# Patient Record
Sex: FEMALE | Race: BLACK OR AFRICAN AMERICAN | NOT HISPANIC OR LATINO | Employment: FULL TIME | URBAN - METROPOLITAN AREA
[De-identification: names, ages, dates, MRNs, and addresses within clinical notes are randomized per-mention and may not be internally consistent; named-entity substitution may affect disease eponyms.]

---

## 2023-02-06 PROBLEM — M17.9 OSTEOARTHRITIS OF KNEE: Status: ACTIVE | Noted: 2023-02-06

## 2023-02-06 PROBLEM — M20.11 HALLUX VALGUS WITH BUNIONS OF RIGHT FOOT: Status: ACTIVE | Noted: 2023-02-06

## 2023-02-06 PROBLEM — R30.0 DYSURIA: Status: ACTIVE | Noted: 2023-02-06

## 2023-02-06 PROBLEM — R31.21 ASYMPTOMATIC MICROSCOPIC HEMATURIA: Status: ACTIVE | Noted: 2023-02-06

## 2023-02-06 PROBLEM — R21 LOCALIZED RASH: Status: ACTIVE | Noted: 2023-02-06

## 2023-02-06 PROBLEM — L74.9 SWEATING ABNORMALITY: Status: ACTIVE | Noted: 2023-02-06

## 2023-02-06 PROBLEM — M21.611 HALLUX VALGUS WITH BUNIONS OF RIGHT FOOT: Status: ACTIVE | Noted: 2023-02-06

## 2023-02-06 PROBLEM — R00.2 PALPITATIONS: Status: ACTIVE | Noted: 2023-02-06

## 2023-02-06 PROBLEM — L84 CORN OF FOOT: Status: ACTIVE | Noted: 2023-02-06

## 2023-02-06 PROBLEM — J30.2 SEASONAL ALLERGIES: Status: ACTIVE | Noted: 2023-02-06

## 2023-02-06 PROBLEM — R23.2 HOT FLASHES: Status: ACTIVE | Noted: 2023-02-06

## 2023-02-06 PROBLEM — E66.9 OBESITY WITH BODY MASS INDEX 30 OR GREATER: Status: ACTIVE | Noted: 2023-02-06

## 2023-02-06 PROBLEM — E66.9 OBESITY: Status: ACTIVE | Noted: 2023-02-06

## 2023-02-06 PROBLEM — E55.9 VITAMIN D DEFICIENCY: Status: ACTIVE | Noted: 2023-02-06

## 2023-02-06 PROBLEM — F32.A DEPRESSION: Status: ACTIVE | Noted: 2023-02-06

## 2023-02-06 PROBLEM — G47.00 INSOMNIA: Status: ACTIVE | Noted: 2023-02-06

## 2023-02-06 PROBLEM — I10 ESSENTIAL HYPERTENSION: Status: ACTIVE | Noted: 2023-02-06

## 2023-02-06 PROBLEM — N64.4 BREAST PAIN: Status: ACTIVE | Noted: 2023-02-06

## 2023-02-06 PROBLEM — I49.3 PVC (PREMATURE VENTRICULAR CONTRACTION): Status: ACTIVE | Noted: 2023-02-06

## 2023-02-06 PROBLEM — M75.21 BICEPS TENDINITIS, RIGHT: Status: ACTIVE | Noted: 2023-02-06

## 2023-02-06 RX ORDER — ACETAMINOPHEN 500 MG
100 TABLET ORAL DAILY
COMMUNITY
Start: 2020-03-11

## 2023-02-06 RX ORDER — ZINC GLUCONATE 50 MG
1 TABLET ORAL DAILY
COMMUNITY
Start: 2022-04-06

## 2023-02-06 RX ORDER — UBIDECARENONE 30 MG
1 CAPSULE ORAL DAILY
COMMUNITY
Start: 2022-04-06

## 2023-02-06 RX ORDER — PAROXETINE 10 MG/1
1 TABLET, FILM COATED ORAL DAILY
COMMUNITY
Start: 2020-07-07 | End: 2023-03-20 | Stop reason: SDUPTHER

## 2023-02-06 RX ORDER — CYANOCOBALAMIN (VITAMIN B-12) 1000MCG/15
LIQUID (ML) ORAL
COMMUNITY
Start: 2022-04-06

## 2023-02-06 RX ORDER — LORATADINE 10 MG/1
1 TABLET ORAL DAILY
COMMUNITY
Start: 2017-07-13 | End: 2023-06-14 | Stop reason: SDUPTHER

## 2023-03-20 ENCOUNTER — OFFICE VISIT (OUTPATIENT)
Dept: PRIMARY CARE | Facility: CLINIC | Age: 53
End: 2023-03-20
Payer: MEDICAID

## 2023-03-20 VITALS
SYSTOLIC BLOOD PRESSURE: 124 MMHG | BODY MASS INDEX: 29.29 KG/M2 | WEIGHT: 209.2 LBS | HEIGHT: 71 IN | DIASTOLIC BLOOD PRESSURE: 82 MMHG | TEMPERATURE: 97.9 F

## 2023-03-20 DIAGNOSIS — Z00.00 ROUTINE GENERAL MEDICAL EXAMINATION AT A HEALTH CARE FACILITY: ICD-10-CM

## 2023-03-20 DIAGNOSIS — F32.A DEPRESSION, UNSPECIFIED DEPRESSION TYPE: Primary | ICD-10-CM

## 2023-03-20 DIAGNOSIS — G47.00 INSOMNIA, UNSPECIFIED TYPE: ICD-10-CM

## 2023-03-20 PROCEDURE — 3079F DIAST BP 80-89 MM HG: CPT | Performed by: FAMILY MEDICINE

## 2023-03-20 PROCEDURE — 3074F SYST BP LT 130 MM HG: CPT | Performed by: FAMILY MEDICINE

## 2023-03-20 PROCEDURE — 99214 OFFICE O/P EST MOD 30 MIN: CPT | Performed by: FAMILY MEDICINE

## 2023-03-20 RX ORDER — PAROXETINE HYDROCHLORIDE 20 MG/1
20 TABLET, FILM COATED ORAL EVERY MORNING
Qty: 180 TABLET | Refills: 1 | Status: SHIPPED | OUTPATIENT
Start: 2023-03-20 | End: 2024-04-12 | Stop reason: SDUPTHER

## 2023-03-20 RX ORDER — TRAZODONE HYDROCHLORIDE 50 MG/1
50 TABLET ORAL NIGHTLY PRN
Qty: 30 TABLET | Refills: 0 | Status: SHIPPED | OUTPATIENT
Start: 2023-03-20 | End: 2023-05-11 | Stop reason: SDUPTHER

## 2023-03-20 RX ORDER — PAROXETINE 10 MG/1
20 TABLET, FILM COATED ORAL EVERY MORNING
Qty: 180 TABLET | Refills: 1 | Status: SHIPPED | OUTPATIENT
Start: 2023-03-20 | End: 2023-03-20 | Stop reason: SDUPTHER

## 2023-03-20 RX ORDER — PROPYLENE GLYCOL 0.06 MG/ML
SOLUTION/ DROPS OPHTHALMIC
COMMUNITY
Start: 2022-09-27 | End: 2024-02-29 | Stop reason: WASHOUT

## 2023-03-20 RX ORDER — ZOLPIDEM TARTRATE 5 MG/1
5 TABLET ORAL NIGHTLY PRN
COMMUNITY
Start: 2022-09-12 | End: 2024-02-29 | Stop reason: WASHOUT

## 2023-03-20 ASSESSMENT — ENCOUNTER SYMPTOMS
SINUS PRESSURE: 0
WOUND: 0
JOINT SWELLING: 0
DIZZINESS: 0
NERVOUS/ANXIOUS: 1
VOMITING: 0
FATIGUE: 0
ACTIVITY CHANGE: 0
LIGHT-HEADEDNESS: 0
PALPITATIONS: 0
ARTHRALGIAS: 0
HEADACHES: 0
NAUSEA: 0
APPETITE CHANGE: 0
DYSURIA: 0
SLEEP DISTURBANCE: 1
EYE PAIN: 0
BLOOD IN STOOL: 0
DYSPHORIC MOOD: 1
CONSTIPATION: 0
SHORTNESS OF BREATH: 0
COUGH: 0
DIARRHEA: 0
ABDOMINAL PAIN: 0

## 2023-03-20 ASSESSMENT — PROMIS GLOBAL HEALTH SCALE
EMOTIONAL_PROBLEMS: OFTEN
RATE_MENTAL_HEALTH: FAIR
RATE_PHYSICAL_HEALTH: GOOD
RATE_AVERAGE_FATIGUE: MODERATE
CARRYOUT_PHYSICAL_ACTIVITIES: MODERATELY
RATE_SOCIAL_SATISFACTION: FAIR
CARRYOUT_SOCIAL_ACTIVITIES: GOOD
RATE_QUALITY_OF_LIFE: GOOD
RATE_AVERAGE_PAIN: 5
RATE_GENERAL_HEALTH: VERY GOOD

## 2023-03-20 ASSESSMENT — PATIENT HEALTH QUESTIONNAIRE - PHQ9
2. FEELING DOWN, DEPRESSED OR HOPELESS: NEARLY EVERY DAY
1. LITTLE INTEREST OR PLEASURE IN DOING THINGS: SEVERAL DAYS
SUM OF ALL RESPONSES TO PHQ9 QUESTIONS 1 AND 2: 4

## 2023-03-20 NOTE — PATIENT INSTRUCTIONS
For scheduling general referrals or orders please call [address] [place] [address] ([phone]).  For scheduling radiology appointments please call [phone].

## 2023-03-20 NOTE — PROGRESS NOTES
"Subjective   Patient ID: Essie Olvera is a 52 y.o. female who presents for Follow-up (Discuss depression ).    HPI   Here today to discuss concerns  She has had a lot of situational stress in her family life in the last few months and feels like it is exacerbating her depression  She has previously been treated for depression but is now only currently Taking paxil 10 mg for hot flashes  Wants to do some counseling as well  She is having a really hard time sleeping due to mood . Melatonin doesn't help   No SI or HI  She has been talking to people in her [facility] which is helpful    She is doing very well after knee revision surgery  She is wondering about if she needs a mammogram  Her colonoscopy is up-to-date  She is status post hysterectomy  Review of Systems   Constitutional:  Negative for activity change, appetite change and fatigue.   HENT:  Negative for congestion, postnasal drip and sinus pressure.    Eyes:  Negative for pain and visual disturbance.   Respiratory:  Negative for cough and shortness of breath.    Cardiovascular:  Negative for chest pain, palpitations and leg swelling.   Gastrointestinal:  Negative for abdominal pain, blood in stool, constipation, diarrhea, nausea and vomiting.   Endocrine: Negative for cold intolerance and heat intolerance.   Genitourinary:  Negative for dysuria and menstrual problem.   Musculoskeletal:  Negative for arthralgias and joint swelling.   Skin:  Negative for rash and wound.   Neurological:  Negative for dizziness, light-headedness and headaches.   Psychiatric/Behavioral:  Positive for dysphoric mood and sleep disturbance. The patient is nervous/anxious.        Objective   /82   Temp 36.6 °C (97.9 °F)   Ht 1.81 m (5' 11.25\")   Wt 94.9 kg (209 lb 3.2 oz)   BMI 28.97 kg/m²     Physical Exam  Vitals and nursing note reviewed.   Constitutional:       Appearance: Normal appearance.   HENT:      Head: Normocephalic and atraumatic.      Right Ear: Tympanic " membrane, ear canal and external ear normal.      Left Ear: Tympanic membrane, ear canal and external ear normal.      Nose: Nose normal.      Mouth/Throat:      Mouth: Mucous membranes are moist.      Pharynx: Oropharynx is clear.   Eyes:      Extraocular Movements: Extraocular movements intact.      Conjunctiva/sclera: Conjunctivae normal.      Pupils: Pupils are equal, round, and reactive to light.   Cardiovascular:      Rate and Rhythm: Normal rate and regular rhythm.      Pulses: Normal pulses.      Heart sounds: Normal heart sounds. No murmur heard.     No friction rub. No gallop.   Pulmonary:      Effort: Pulmonary effort is normal. No respiratory distress.      Breath sounds: Normal breath sounds.   Musculoskeletal:         General: No swelling or deformity. Normal range of motion.      Cervical back: Normal range of motion and neck supple.   Lymphadenopathy:      Cervical: No cervical adenopathy.   Skin:     General: Skin is warm and dry.      Capillary Refill: Capillary refill takes less than 2 seconds.      Findings: No rash.   Neurological:      General: No focal deficit present.      Mental Status: She is alert and oriented to person, place, and time. Mental status is at baseline.      Cranial Nerves: No cranial nerve deficit.      Gait: Gait normal.      Deep Tendon Reflexes: Reflexes normal.   Psychiatric:         Thought Content: Thought content normal.         Judgment: Judgment normal.      Comments: tearful         Assessment/Plan   Problem List Items Addressed This Visit          Nervous    Insomnia    Relevant Medications    traZODone (Desyrel) 50 mg tablet       Other    Depression - Primary    Relevant Medications    PARoxetine (Paxil) 10 mg tablet    traZODone (Desyrel) 50 mg tablet    Other Relevant Orders    Referral to Psychology     Other Visit Diagnoses       Routine general medical examination at a health care facility        Relevant Orders    BI mammo bilateral screening tomosynthesis         We discussed increasing her Paxil to 20 mg daily.  We will also add trazodone.  Risk benefits and side effects of medication discussed  Counseling referral given  We will plan to see her back in about 3 months unless concerns sooner  Mammogram ordered    Patient verbalized understanding of plan of care and all questions were answered.       Baltazar Yeh, DO

## 2023-04-03 ENCOUNTER — TELEPHONE (OUTPATIENT)
Dept: PRIMARY CARE | Facility: CLINIC | Age: 53
End: 2023-04-03

## 2023-04-03 NOTE — TELEPHONE ENCOUNTER
----- Message from Baltazar Yeh DO sent at 4/3/2023  7:57 AM EDT -----  Mammogram is normal. Recheck yearly unless concern sooner.

## 2023-05-11 DIAGNOSIS — F32.A DEPRESSION, UNSPECIFIED DEPRESSION TYPE: ICD-10-CM

## 2023-05-11 DIAGNOSIS — G47.00 INSOMNIA, UNSPECIFIED TYPE: ICD-10-CM

## 2023-05-12 RX ORDER — TRAZODONE HYDROCHLORIDE 50 MG/1
50 TABLET ORAL NIGHTLY PRN
Qty: 30 TABLET | Refills: 0 | Status: SHIPPED | OUTPATIENT
Start: 2023-05-12 | End: 2023-08-30 | Stop reason: SDUPTHER

## 2023-06-14 DIAGNOSIS — J30.2 SEASONAL ALLERGIES: Primary | ICD-10-CM

## 2023-06-14 RX ORDER — LORATADINE 10 MG/1
10 TABLET ORAL DAILY
Qty: 90 TABLET | Refills: 3 | Status: SHIPPED | OUTPATIENT
Start: 2023-06-14 | End: 2024-04-12 | Stop reason: SDUPTHER

## 2023-06-14 RX ORDER — LORATADINE 10 MG/1
10 TABLET ORAL
Status: CANCELLED | OUTPATIENT
Start: 2023-06-14

## 2023-06-26 ENCOUNTER — APPOINTMENT (OUTPATIENT)
Dept: PRIMARY CARE | Facility: CLINIC | Age: 53
End: 2023-06-26

## 2023-08-02 ENCOUNTER — OFFICE VISIT (OUTPATIENT)
Dept: PRIMARY CARE | Facility: CLINIC | Age: 53
End: 2023-08-02
Payer: MEDICAID

## 2023-08-02 VITALS
DIASTOLIC BLOOD PRESSURE: 90 MMHG | TEMPERATURE: 97.3 F | SYSTOLIC BLOOD PRESSURE: 130 MMHG | WEIGHT: 205.6 LBS | BODY MASS INDEX: 28.47 KG/M2

## 2023-08-02 DIAGNOSIS — R35.0 URINARY FREQUENCY: Primary | ICD-10-CM

## 2023-08-02 LAB
APPEARANCE, URINE: NORMAL
BILIRUBIN, URINE: NEGATIVE
BLOOD, URINE: NEGATIVE
COLOR, URINE: YELLOW
GLUCOSE, URINE: NEGATIVE MG/DL
KETONES, URINE: NEGATIVE MG/DL
LEUKOCYTE ESTERASE, URINE: NEGATIVE
NITRITE, URINE: NEGATIVE
PH, URINE: 5 (ref 5–8)
PROTEIN, URINE: NEGATIVE MG/DL
SPECIFIC GRAVITY, URINE: 1.03 (ref 1–1.03)
UROBILINOGEN, URINE: <2 MG/DL (ref 0–1.9)

## 2023-08-02 PROCEDURE — 87661 TRICHOMONAS VAGINALIS AMPLIF: CPT

## 2023-08-02 PROCEDURE — 87086 URINE CULTURE/COLONY COUNT: CPT

## 2023-08-02 PROCEDURE — 3075F SYST BP GE 130 - 139MM HG: CPT | Performed by: FAMILY MEDICINE

## 2023-08-02 PROCEDURE — 87491 CHLMYD TRACH DNA AMP PROBE: CPT

## 2023-08-02 PROCEDURE — 3080F DIAST BP >= 90 MM HG: CPT | Performed by: FAMILY MEDICINE

## 2023-08-02 PROCEDURE — 99213 OFFICE O/P EST LOW 20 MIN: CPT | Performed by: FAMILY MEDICINE

## 2023-08-02 PROCEDURE — 87591 N.GONORRHOEAE DNA AMP PROB: CPT

## 2023-08-02 PROCEDURE — 81003 URINALYSIS AUTO W/O SCOPE: CPT

## 2023-08-02 RX ORDER — PRENATAL VIT CALC,IRON,FOLIC
500 TABLET ORAL
COMMUNITY
Start: 2021-05-28

## 2023-08-02 ASSESSMENT — ENCOUNTER SYMPTOMS
FREQUENCY: 1
HEMATURIA: 0
CHILLS: 0
FLANK PAIN: 0
NAUSEA: 0
SWEATS: 0
VOMITING: 0

## 2023-08-02 ASSESSMENT — PATIENT HEALTH QUESTIONNAIRE - PHQ9
1. LITTLE INTEREST OR PLEASURE IN DOING THINGS: NOT AT ALL
2. FEELING DOWN, DEPRESSED OR HOPELESS: NOT AT ALL
SUM OF ALL RESPONSES TO PHQ9 QUESTIONS 1 AND 2: 0

## 2023-08-02 NOTE — PROGRESS NOTES
Subjective   Patient ID: Essie Olvera is a 53 y.o. female who presents for UTI.    UTI   This is a new problem. The problem has been gradually improving. Associated symptoms include frequency, hesitancy, a possible pregnancy and urgency. Pertinent negatives include no chills, discharge, flank pain, hematuria, nausea, sweats or vomiting.    No recent sexually activity   No std concerns     Review of Systems   Constitutional:  Negative for chills.   Gastrointestinal:  Negative for nausea and vomiting.   Genitourinary:  Positive for frequency, hesitancy and urgency. Negative for flank pain and hematuria.       Objective   /90   Temp 36.3 °C (97.3 °F)   Wt 93.3 kg (205 lb 9.6 oz)   BMI 28.47 kg/m²     Physical Exam  Vitals and nursing note reviewed.   Constitutional:       Appearance: Normal appearance. She is normal weight.   HENT:      Head: Normocephalic and atraumatic.      Right Ear: Tympanic membrane, ear canal and external ear normal.      Left Ear: Tympanic membrane, ear canal and external ear normal.      Mouth/Throat:      Mouth: Mucous membranes are moist.   Eyes:      Conjunctiva/sclera: Conjunctivae normal.   Cardiovascular:      Rate and Rhythm: Normal rate and regular rhythm.      Heart sounds: Normal heart sounds.   Pulmonary:      Effort: Pulmonary effort is normal.      Breath sounds: Normal breath sounds.   Abdominal:      General: Abdomen is flat.      Palpations: Abdomen is soft.   Musculoskeletal:         General: No swelling.      Cervical back: Normal range of motion and neck supple.   Skin:     General: Skin is warm and dry.      Capillary Refill: Capillary refill takes less than 2 seconds.   Neurological:      General: No focal deficit present.      Mental Status: She is alert. Mental status is at baseline.   Psychiatric:         Mood and Affect: Mood normal.         Behavior: Behavior normal.         Thought Content: Thought content normal.         Judgment: Judgment normal.          Assessment/Plan   1. Urinary frequency  Labs as below. Follow up based on results   - C. trachomatis / N. gonorrhoeae, DNA probe; Future  - Trichomonas vaginalis, Amplified; Future  - Urine Culture; Future  - Urinalysis with Reflex Microscopic; Future      Baltazar Yeh, DO

## 2023-08-03 ENCOUNTER — TELEPHONE (OUTPATIENT)
Dept: PRIMARY CARE | Facility: CLINIC | Age: 53
End: 2023-08-03
Payer: MEDICAID

## 2023-08-03 NOTE — TELEPHONE ENCOUNTER
----- Message from Baltazar Yeh DO sent at 8/3/2023  6:56 AM EDT -----  Let pt know urine was normal

## 2023-08-04 LAB
CHLAMYDIA TRACH., AMPLIFIED: NEGATIVE
N. GONORRHEA, AMPLIFIED: NEGATIVE
TRICHOMONAS VAGINALIS: NEGATIVE
URINE CULTURE: NORMAL

## 2023-08-30 DIAGNOSIS — F32.A DEPRESSION, UNSPECIFIED DEPRESSION TYPE: ICD-10-CM

## 2023-08-30 DIAGNOSIS — G47.00 INSOMNIA, UNSPECIFIED TYPE: ICD-10-CM

## 2023-08-30 RX ORDER — TRAZODONE HYDROCHLORIDE 50 MG/1
50 TABLET ORAL NIGHTLY PRN
Qty: 30 TABLET | Refills: 3 | Status: SHIPPED | OUTPATIENT
Start: 2023-08-30 | End: 2024-01-03 | Stop reason: SDUPTHER

## 2023-09-07 RX ORDER — METOPROLOL SUCCINATE 25 MG/1
1 TABLET, EXTENDED RELEASE ORAL DAILY
COMMUNITY
Start: 2020-09-08 | End: 2024-02-29 | Stop reason: WASHOUT

## 2023-09-11 ENCOUNTER — APPOINTMENT (OUTPATIENT)
Dept: PRIMARY CARE | Facility: CLINIC | Age: 53
End: 2023-09-11
Payer: MEDICAID

## 2023-10-04 ENCOUNTER — OFFICE VISIT (OUTPATIENT)
Dept: PRIMARY CARE | Facility: CLINIC | Age: 53
End: 2023-10-04
Payer: COMMERCIAL

## 2023-10-04 VITALS
HEART RATE: 73 BPM | DIASTOLIC BLOOD PRESSURE: 75 MMHG | SYSTOLIC BLOOD PRESSURE: 129 MMHG | BODY MASS INDEX: 28.97 KG/M2 | WEIGHT: 209.2 LBS | TEMPERATURE: 97.8 F

## 2023-10-04 DIAGNOSIS — Z23 ENCOUNTER FOR IMMUNIZATION: Primary | ICD-10-CM

## 2023-10-04 PROCEDURE — 3078F DIAST BP <80 MM HG: CPT | Performed by: INTERNAL MEDICINE

## 2023-10-04 PROCEDURE — 90686 IIV4 VACC NO PRSV 0.5 ML IM: CPT | Performed by: INTERNAL MEDICINE

## 2023-10-04 PROCEDURE — 99213 OFFICE O/P EST LOW 20 MIN: CPT | Performed by: INTERNAL MEDICINE

## 2023-10-04 PROCEDURE — G0008 ADMIN INFLUENZA VIRUS VAC: HCPCS | Performed by: INTERNAL MEDICINE

## 2023-10-04 PROCEDURE — 3074F SYST BP LT 130 MM HG: CPT | Performed by: INTERNAL MEDICINE

## 2023-10-04 RX ORDER — NAPROXEN 500 MG/1
500 TABLET ORAL
COMMUNITY
Start: 2023-09-28 | End: 2023-10-13
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
## 2023-11-28 ENCOUNTER — TELEPHONE (OUTPATIENT)
Dept: PRIMARY CARE | Facility: CLINIC | Age: 53
End: 2023-11-28
Payer: MEDICAID

## 2023-11-28 NOTE — TELEPHONE ENCOUNTER
Patient had 3rd no show 11/27/23.  She also missed 7/7/22 and 9/27/22.  Patient was mailed the appointment policy after 9/27/22 missed appointment.  Please advise

## 2024-01-03 DIAGNOSIS — G47.00 INSOMNIA, UNSPECIFIED TYPE: ICD-10-CM

## 2024-01-03 DIAGNOSIS — F32.A DEPRESSION, UNSPECIFIED DEPRESSION TYPE: ICD-10-CM

## 2024-01-03 RX ORDER — TRAZODONE HYDROCHLORIDE 50 MG/1
50 TABLET ORAL NIGHTLY PRN
Qty: 30 TABLET | Refills: 3 | Status: SHIPPED | OUTPATIENT
Start: 2024-01-03 | End: 2025-01-02

## 2024-02-28 PROBLEM — M25.661 DECREASED RANGE OF MOTION OF RIGHT KNEE: Status: RESOLVED | Noted: 2022-05-25 | Resolved: 2024-02-28

## 2024-02-28 PROBLEM — H52.7 REFRACTIVE ERROR: Status: ACTIVE | Noted: 2017-07-26

## 2024-02-28 PROBLEM — N39.0 URINARY TRACT INFECTIOUS DISEASE: Status: RESOLVED | Noted: 2024-02-28 | Resolved: 2024-02-28

## 2024-02-28 PROBLEM — R80.9 URINE TEST POSITIVE FOR MICROALBUMINURIA: Status: ACTIVE | Noted: 2017-05-19

## 2024-02-28 PROBLEM — M54.9 BACK PAIN: Status: RESOLVED | Noted: 2021-02-25 | Resolved: 2024-02-28

## 2024-02-28 PROBLEM — M25.562 CHRONIC PAIN OF BOTH KNEES: Status: RESOLVED | Noted: 2019-06-28 | Resolved: 2024-02-28

## 2024-02-28 PROBLEM — H53.8 BLURRY VISION, BILATERAL: Status: RESOLVED | Noted: 2017-07-26 | Resolved: 2024-02-28

## 2024-02-28 PROBLEM — H52.4 MYOPIA WITH PRESBYOPIA OF BOTH EYES: Status: ACTIVE | Noted: 2022-09-27

## 2024-02-28 PROBLEM — R29.898 WEAKNESS OF BOTH LOWER EXTREMITIES: Status: RESOLVED | Noted: 2018-10-23 | Resolved: 2024-02-28

## 2024-02-28 PROBLEM — Q14.0: Status: ACTIVE | Noted: 2017-07-26

## 2024-02-28 PROBLEM — H00.11 CHALAZION OF RIGHT UPPER EYELID: Status: RESOLVED | Noted: 2017-07-26 | Resolved: 2024-02-28

## 2024-02-28 PROBLEM — N76.0 BACTERIAL VAGINOSIS: Status: RESOLVED | Noted: 2024-02-28 | Resolved: 2024-02-28

## 2024-02-28 PROBLEM — B96.89 ACUTE BACTERIAL SINUSITIS: Status: RESOLVED | Noted: 2024-02-28 | Resolved: 2024-02-28

## 2024-02-28 PROBLEM — J01.90 ACUTE BACTERIAL SINUSITIS: Status: RESOLVED | Noted: 2024-02-28 | Resolved: 2024-02-28

## 2024-02-28 PROBLEM — R29.898 BILATERAL LEG WEAKNESS: Status: RESOLVED | Noted: 2018-10-23 | Resolved: 2024-02-28

## 2024-02-28 PROBLEM — H53.2 DIPLOPIA: Status: ACTIVE | Noted: 2022-09-27

## 2024-02-28 PROBLEM — M25.561 CHRONIC PAIN OF BOTH KNEES: Status: RESOLVED | Noted: 2019-06-28 | Resolved: 2024-02-28

## 2024-02-28 PROBLEM — B37.2 CANDIDAL INTERTRIGO: Status: RESOLVED | Noted: 2024-02-28 | Resolved: 2024-02-28

## 2024-02-28 PROBLEM — G89.29 CHRONIC PAIN OF BOTH KNEES: Status: RESOLVED | Noted: 2019-06-28 | Resolved: 2024-02-28

## 2024-02-28 PROBLEM — H04.123 DRY EYE SYNDROME OF BOTH EYES: Status: ACTIVE | Noted: 2017-07-26

## 2024-02-28 PROBLEM — H52.13 MYOPIA WITH PRESBYOPIA OF BOTH EYES: Status: ACTIVE | Noted: 2022-09-27

## 2024-02-28 PROBLEM — N64.81 BREAST PTOSIS: Status: ACTIVE | Noted: 2021-09-13

## 2024-02-28 PROBLEM — K44.9 HIATAL HERNIA: Status: ACTIVE | Noted: 2021-02-25

## 2024-02-28 PROBLEM — M77.11 LATERAL EPICONDYLITIS OF RIGHT ELBOW: Status: ACTIVE | Noted: 2018-09-07

## 2024-02-28 PROBLEM — G89.29 CHRONIC LOW BACK PAIN: Status: RESOLVED | Noted: 2021-03-30 | Resolved: 2024-02-28

## 2024-02-28 PROBLEM — R53.81 OTHER MALAISE: Status: RESOLVED | Noted: 2021-03-30 | Resolved: 2024-02-28

## 2024-02-28 PROBLEM — J20.9 ACUTE BRONCHITIS: Status: RESOLVED | Noted: 2024-02-28 | Resolved: 2024-02-28

## 2024-02-28 PROBLEM — L57.4 LAXITY OF SKIN: Status: ACTIVE | Noted: 2021-09-13

## 2024-02-28 PROBLEM — M54.50 CHRONIC LOW BACK PAIN: Status: RESOLVED | Noted: 2021-03-30 | Resolved: 2024-02-28

## 2024-02-28 PROBLEM — N89.8 VAGINAL DISCHARGE: Status: RESOLVED | Noted: 2024-02-28 | Resolved: 2024-02-28

## 2024-02-28 PROBLEM — B96.89 BACTERIAL VAGINOSIS: Status: RESOLVED | Noted: 2024-02-28 | Resolved: 2024-02-28

## 2024-02-28 PROBLEM — Z86.19 HISTORY OF CHLAMYDIA: Status: RESOLVED | Noted: 2017-05-20 | Resolved: 2024-02-28

## 2024-02-28 PROBLEM — J06.9 VIRAL UPPER RESPIRATORY TRACT INFECTION: Status: RESOLVED | Noted: 2024-02-28 | Resolved: 2024-02-28

## 2024-02-29 ENCOUNTER — OFFICE VISIT (OUTPATIENT)
Dept: PRIMARY CARE | Facility: CLINIC | Age: 54
End: 2024-02-29
Payer: COMMERCIAL

## 2024-02-29 VITALS
BODY MASS INDEX: 28 KG/M2 | HEIGHT: 71 IN | DIASTOLIC BLOOD PRESSURE: 80 MMHG | WEIGHT: 200 LBS | TEMPERATURE: 97.3 F | SYSTOLIC BLOOD PRESSURE: 138 MMHG

## 2024-02-29 DIAGNOSIS — M77.8 LEFT ELBOW TENDONITIS: Primary | ICD-10-CM

## 2024-02-29 PROCEDURE — 3075F SYST BP GE 130 - 139MM HG: CPT | Performed by: NURSE PRACTITIONER

## 2024-02-29 PROCEDURE — 3079F DIAST BP 80-89 MM HG: CPT | Performed by: NURSE PRACTITIONER

## 2024-02-29 PROCEDURE — 99213 OFFICE O/P EST LOW 20 MIN: CPT | Performed by: NURSE PRACTITIONER

## 2024-02-29 PROCEDURE — 1036F TOBACCO NON-USER: CPT | Performed by: NURSE PRACTITIONER

## 2024-02-29 ASSESSMENT — ENCOUNTER SYMPTOMS: CONSTITUTIONAL NEGATIVE: 1

## 2024-02-29 NOTE — PROGRESS NOTES
"Subjective   Patient ID: Essie Olvera is a 53 y.o. female who presents for Elbow Pain (Left sided x months).    HPI Presents today with concerns for left elbow pain.  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   Her left elbow started to bother her a bit and it has progressed eoer the past few month.  Hurts now to completley extended.  Has pinpoint pain.  No injury or fall noted.  No redness, selling or warmth noted    Review of Systems   Constitutional: Negative.    Musculoskeletal:         As noted in HPI         Objective   /80 (BP Location: Left arm, Patient Position: Sitting)   Temp 36.3 °C (97.3 °F) (Temporal)   Ht 1.81 m (5' 11.25\")   Wt 90.7 kg (200 lb)   BMI 27.70 kg/m²     Physical Exam  Constitutional:       Appearance: Normal appearance. She is normal weight.   Musculoskeletal:      Comments: Pain with with palpation on radial collateral ligament with palpation.  NO redness or swelling noted.    Neurological:      Mental Status: She is alert.         Assessment/Plan   Problem List Items Addressed This Visit    None  Visit Diagnoses         Codes    Left elbow tendonitis    -  Primary M77.8    Relevant Orders    Referral to Orthopaedic Surgery               "

## 2024-04-12 ENCOUNTER — OFFICE VISIT (OUTPATIENT)
Dept: PRIMARY CARE | Facility: CLINIC | Age: 54
End: 2024-04-12
Payer: COMMERCIAL

## 2024-04-12 VITALS
TEMPERATURE: 98.2 F | DIASTOLIC BLOOD PRESSURE: 80 MMHG | BODY MASS INDEX: 28.67 KG/M2 | SYSTOLIC BLOOD PRESSURE: 120 MMHG | WEIGHT: 207 LBS

## 2024-04-12 DIAGNOSIS — K59.00 CONSTIPATION, UNSPECIFIED CONSTIPATION TYPE: Primary | ICD-10-CM

## 2024-04-12 DIAGNOSIS — F32.A DEPRESSION, UNSPECIFIED DEPRESSION TYPE: ICD-10-CM

## 2024-04-12 DIAGNOSIS — J30.2 SEASONAL ALLERGIES: ICD-10-CM

## 2024-04-12 DIAGNOSIS — Z12.31 ENCOUNTER FOR SCREENING MAMMOGRAM FOR MALIGNANT NEOPLASM OF BREAST: ICD-10-CM

## 2024-04-12 PROCEDURE — 3074F SYST BP LT 130 MM HG: CPT | Performed by: FAMILY MEDICINE

## 2024-04-12 PROCEDURE — 99214 OFFICE O/P EST MOD 30 MIN: CPT | Performed by: FAMILY MEDICINE

## 2024-04-12 PROCEDURE — 3079F DIAST BP 80-89 MM HG: CPT | Performed by: FAMILY MEDICINE

## 2024-04-12 PROCEDURE — 1036F TOBACCO NON-USER: CPT | Performed by: FAMILY MEDICINE

## 2024-04-12 RX ORDER — PAROXETINE HYDROCHLORIDE 20 MG/1
20 TABLET, FILM COATED ORAL EVERY MORNING
Qty: 180 TABLET | Refills: 1 | Status: SHIPPED | OUTPATIENT
Start: 2024-04-12 | End: 2024-05-01 | Stop reason: ALTCHOICE

## 2024-04-12 RX ORDER — LORATADINE 10 MG/1
10 TABLET ORAL DAILY
Qty: 90 TABLET | Refills: 3 | Status: SHIPPED | OUTPATIENT
Start: 2024-04-12 | End: 2025-04-12

## 2024-04-12 RX ORDER — DOCUSATE SODIUM 100 MG/1
100 CAPSULE, LIQUID FILLED ORAL 2 TIMES DAILY
Qty: 60 CAPSULE | Refills: 0 | Status: SHIPPED | OUTPATIENT
Start: 2024-04-12 | End: 2024-05-01 | Stop reason: ALTCHOICE

## 2024-04-12 ASSESSMENT — PATIENT HEALTH QUESTIONNAIRE - PHQ9
2. FEELING DOWN, DEPRESSED OR HOPELESS: NOT AT ALL
SUM OF ALL RESPONSES TO PHQ9 QUESTIONS 1 AND 2: 0
1. LITTLE INTEREST OR PLEASURE IN DOING THINGS: NOT AT ALL

## 2024-04-12 NOTE — PROGRESS NOTES
Subjective   Patient ID: Essie Olvera is a 53 y.o. female who presents for Constipation (Last BM x 2 days. Off and on for 2 months ).    HPI   2 months constipation   +straining   Stools seem to be extra-large per patient report  No blood or mucus in bowel movements  Some mild nausea but no vomiting   No abdominal pain or GERD  No urinary or vaginal symptoms  No meds treid but has been trying to increase her juice intake   2021 had gastric sleeve- told by Dr montoya that constipation was common post op but she has not had issues with this for a while   Normal colonoscopy 2020    Anxiety depression-well-controlled on Paxil.  No issues    Requesting refill on her Claritin which she takes daily for seasonal allergies    No other new concerns    Review of Systems  10 point review of systems negative except for what is noted in HPI  Objective   /80   Temp 36.8 °C (98.2 °F)   Wt 93.9 kg (207 lb)   BMI 28.67 kg/m²     Physical Exam  Vitals and nursing note reviewed.   Constitutional:       Appearance: Normal appearance. She is normal weight.   HENT:      Head: Normocephalic and atraumatic.      Right Ear: External ear normal.      Left Ear: External ear normal.      Mouth/Throat:      Mouth: Mucous membranes are moist.   Eyes:      Conjunctiva/sclera: Conjunctivae normal.   Cardiovascular:      Rate and Rhythm: Normal rate and regular rhythm.      Heart sounds: Normal heart sounds.   Pulmonary:      Effort: Pulmonary effort is normal.      Breath sounds: Normal breath sounds.   Abdominal:      General: Abdomen is flat. Bowel sounds are normal. There is no distension.      Palpations: There is no mass.      Tenderness: There is no abdominal tenderness. There is no right CVA tenderness, left CVA tenderness, guarding or rebound.   Musculoskeletal:         General: No swelling.      Cervical back: Normal range of motion and neck supple.   Skin:     General: Skin is warm and dry.      Capillary Refill: Capillary  refill takes less than 2 seconds.   Neurological:      General: No focal deficit present.      Mental Status: She is alert. Mental status is at baseline.   Psychiatric:         Mood and Affect: Mood normal.         Behavior: Behavior normal.         Thought Content: Thought content normal.         Judgment: Judgment normal.         Assessment/Plan   1. Constipation, unspecified constipation type  Discussed with patient may be a function of gastric sleeve.  Discussed increasing water, dietary fiber and adding a fiber supplement.  Can also use Colace  Discussed with patient that if her symptoms persist or she requires medication daily to have normal bowel movements given her history she should see gastroenterology and referral has been placed  - docusate sodium (Colace) 100 mg capsule; Take 1 capsule (100 mg) by mouth 2 times a day.  Dispense: 60 capsule; Refill: 0  - Referral to Gastroenterology; Future    2. Seasonal allergies  Controlled. Continue current medicines/regimen.     - loratadine (Claritin) 10 mg tablet; Take 1 tablet (10 mg) by mouth once daily.  Dispense: 90 tablet; Refill: 3    3. Depression, unspecified depression type  Controlled. Continue current medicines/regimen.   - PARoxetine (Paxil) 20 mg tablet; Take 1 tablet (20 mg) by mouth once daily in the morning.  Dispense: 180 tablet; Refill: 1    4. Encounter for screening mammogram for malignant neoplasm of breast  - BI mammo bilateral screening tomosynthesis; Future      Baltazar Yeh, DO

## 2024-04-12 NOTE — PATIENT INSTRUCTIONS
For your constipation try to increase your fluid and fiber intake.  You can add an over-the-counter supplement like Benefiber or Metamucil once a day  I am prescribing a stool softener called Colace that you can take 1-2 times a day  Goal is 1 soft semiformed bowel movement a day.  If your symptoms do not improve or you feel you are dependent on Colace I would suggest seeing a gastroenterologist and I have attached the information

## 2024-04-26 ENCOUNTER — OFFICE VISIT (OUTPATIENT)
Dept: PRIMARY CARE | Facility: CLINIC | Age: 54
End: 2024-04-26
Payer: COMMERCIAL

## 2024-04-26 VITALS
OXYGEN SATURATION: 98 % | HEART RATE: 62 BPM | BODY MASS INDEX: 28.03 KG/M2 | DIASTOLIC BLOOD PRESSURE: 88 MMHG | WEIGHT: 202.4 LBS | SYSTOLIC BLOOD PRESSURE: 140 MMHG | TEMPERATURE: 98.4 F

## 2024-04-26 RX ORDER — AMOXICILLIN AND CLAVULANATE POTASSIUM 875; 125 MG/1; MG/1
1 TABLET, FILM COATED ORAL EVERY 12 HOURS
COMMUNITY
Start: 2024-04-25 | End: 2024-05-02

## 2024-04-26 RX ORDER — OXYCODONE AND ACETAMINOPHEN 5; 325 MG/1; MG/1
1 TABLET ORAL EVERY 6 HOURS PRN
COMMUNITY
Start: 2024-04-25 | End: 2024-05-01

## 2024-04-26 ASSESSMENT — ENCOUNTER SYMPTOMS
LOSS OF SENSATION IN FEET: 0
DEPRESSION: 0
OCCASIONAL FEELINGS OF UNSTEADINESS: 0

## 2024-04-26 ASSESSMENT — PATIENT HEALTH QUESTIONNAIRE - PHQ9
SUM OF ALL RESPONSES TO PHQ9 QUESTIONS 1 AND 2: 0
2. FEELING DOWN, DEPRESSED OR HOPELESS: NOT AT ALL
1. LITTLE INTEREST OR PLEASURE IN DOING THINGS: NOT AT ALL

## 2024-05-01 ENCOUNTER — OFFICE VISIT (OUTPATIENT)
Dept: PRIMARY CARE | Facility: CLINIC | Age: 54
End: 2024-05-01
Payer: COMMERCIAL

## 2024-05-01 VITALS — DIASTOLIC BLOOD PRESSURE: 80 MMHG | SYSTOLIC BLOOD PRESSURE: 140 MMHG

## 2024-05-01 DIAGNOSIS — F32.A DEPRESSION, UNSPECIFIED DEPRESSION TYPE: ICD-10-CM

## 2024-05-01 DIAGNOSIS — R23.2 HOT FLASHES: Primary | ICD-10-CM

## 2024-05-01 DIAGNOSIS — G47.00 INSOMNIA, UNSPECIFIED TYPE: ICD-10-CM

## 2024-05-01 PROCEDURE — 3077F SYST BP >= 140 MM HG: CPT | Performed by: FAMILY MEDICINE

## 2024-05-01 PROCEDURE — 99213 OFFICE O/P EST LOW 20 MIN: CPT | Performed by: FAMILY MEDICINE

## 2024-05-01 PROCEDURE — 3079F DIAST BP 80-89 MM HG: CPT | Performed by: FAMILY MEDICINE

## 2024-05-01 PROCEDURE — 1036F TOBACCO NON-USER: CPT | Performed by: FAMILY MEDICINE

## 2024-05-01 RX ORDER — TRAZODONE HYDROCHLORIDE 50 MG/1
50 TABLET ORAL NIGHTLY PRN
Qty: 30 TABLET | Refills: 3 | Status: CANCELLED | OUTPATIENT
Start: 2024-05-01 | End: 2025-05-01

## 2024-05-01 ASSESSMENT — PATIENT HEALTH QUESTIONNAIRE - PHQ9
2. FEELING DOWN, DEPRESSED OR HOPELESS: NOT AT ALL
1. LITTLE INTEREST OR PLEASURE IN DOING THINGS: NOT AT ALL
SUM OF ALL RESPONSES TO PHQ9 QUESTIONS 1 AND 2: 0

## 2024-05-01 NOTE — LETTER
May 2, 2024     Essie Olvera  [address] [id number]    Patient: Essie Olvera   YOB: 1970   Date of Visit: 5/1/2024     To whom it may concern:     Essie Olvera is under my medical care.  She can do a full squat without restrictions.  Please contact my office if you have any questions or concerns       Sincerely,        Baltazar Yeh,         CC: No Recipients      Progress Notes:

## 2024-05-01 NOTE — LETTER
May 1, 2024     Patient: Essie Olvera   YOB: 1970   Date of Visit: 5/1/2024       To Whom It May Concern:    Essie Olvera is under my medical care.  She previously took paroxetine for hot flashes but is no longer taking this medication.  I have no concerns about her mental health .  If you have any questions or concerns, please don't hesitate to call.         Sincerely,         Baltazar Yeh,         CC: No Recipients

## 2024-05-01 NOTE — PROGRESS NOTES
Subjective   Patient ID: Essie Olvera is a 53 y.o. female who presents for Follow-up.    HPI   Patient needing a note seeing that she is no longer taking paroxetine  She was previously taking for hot flashes but does not feel like she needs it anymore she has a remote history of depression but states her mood has been very good and no concerns  She was previously using trazodone intermittently for insomnia  But is not using that at this point either and feels she is doing well  She is interviewing for [employer] position and cannot be on any of these type of medications    No new concerns  Review of Systems  As noted HPI  Objective   /80     Physical Exam  Vitals and nursing note reviewed.   Constitutional:       Appearance: Normal appearance. She is normal weight.   HENT:      Head: Normocephalic and atraumatic.      Right Ear: External ear normal.      Left Ear: External ear normal.      Mouth/Throat:      Mouth: Mucous membranes are moist.   Eyes:      Conjunctiva/sclera: Conjunctivae normal.   Cardiovascular:      Rate and Rhythm: Normal rate and regular rhythm.      Heart sounds: Normal heart sounds.   Pulmonary:      Effort: Pulmonary effort is normal.      Breath sounds: Normal breath sounds.   Musculoskeletal:         General: No swelling.      Cervical back: Normal range of motion and neck supple.   Skin:     General: Skin is warm and dry.      Capillary Refill: Capillary refill takes less than 2 seconds.   Neurological:      General: No focal deficit present.      Mental Status: She is alert. Mental status is at baseline.   Psychiatric:         Attention and Perception: Attention and perception normal.         Mood and Affect: Mood normal.         Speech: Speech normal.         Behavior: Behavior normal.         Thought Content: Thought content normal.         Cognition and Memory: Cognition normal.         Judgment: Judgment normal.         Assessment/Plan   1. Hot flashes        2. Insomnia,  unspecified type        3. Depression, unspecified depression type          Discussed with patient I do not believe she needs to be on any of these medications and she has been off them for a period of time without any concerns    Her mental status is stable her hot flashes are improved  She can follow-up as needed for these  Baltazar Yeh,

## 2024-05-02 ENCOUNTER — TELEPHONE (OUTPATIENT)
Dept: PRIMARY CARE | Facility: CLINIC | Age: 54
End: 2024-05-02

## 2024-05-02 ENCOUNTER — APPOINTMENT (OUTPATIENT)
Dept: PRIMARY CARE | Facility: CLINIC | Age: 54
End: 2024-05-02
Payer: COMMERCIAL

## 2024-05-02 NOTE — TELEPHONE ENCOUNTER
Patient states she needs a note that states she's able to do a full squat without any restrictions

## 2024-05-22 ENCOUNTER — HOSPITAL ENCOUNTER (OUTPATIENT)
Dept: RADIOLOGY | Facility: CLINIC | Age: 54
Discharge: HOME | End: 2024-05-22
Payer: COMMERCIAL

## 2024-05-22 VITALS — HEIGHT: 71 IN | BODY MASS INDEX: 28.28 KG/M2 | WEIGHT: 202 LBS

## 2024-05-22 DIAGNOSIS — Z12.31 ENCOUNTER FOR SCREENING MAMMOGRAM FOR MALIGNANT NEOPLASM OF BREAST: ICD-10-CM

## 2024-05-22 PROCEDURE — 77063 BREAST TOMOSYNTHESIS BI: CPT | Performed by: STUDENT IN AN ORGANIZED HEALTH CARE EDUCATION/TRAINING PROGRAM

## 2024-05-22 PROCEDURE — 77067 SCR MAMMO BI INCL CAD: CPT

## 2024-05-22 PROCEDURE — 77067 SCR MAMMO BI INCL CAD: CPT | Performed by: STUDENT IN AN ORGANIZED HEALTH CARE EDUCATION/TRAINING PROGRAM

## 2024-06-25 PROBLEM — M79.18 MYOFASCIAL PAIN DYSFUNCTION SYNDROME: Status: ACTIVE | Noted: 2024-06-25

## 2024-06-25 PROBLEM — M51.36 LUMBAR DEGENERATIVE DISC DISEASE: Status: ACTIVE | Noted: 2024-06-25

## 2024-06-25 PROBLEM — M46.1 SACROILIITIS (CMS-HCC): Status: ACTIVE | Noted: 2024-06-25

## 2024-06-25 PROBLEM — F43.23 ADJUSTMENT DISORDER WITH MIXED ANXIETY AND DEPRESSED MOOD: Status: ACTIVE | Noted: 2024-06-25

## 2024-06-25 PROBLEM — R53.81 PHYSICAL DECONDITIONING: Status: ACTIVE | Noted: 2024-06-25

## 2024-06-25 PROBLEM — R26.81 GAIT INSTABILITY: Status: ACTIVE | Noted: 2024-06-25

## 2024-06-25 PROBLEM — M70.51 PES ANSERINUS BURSITIS OF RIGHT KNEE: Status: ACTIVE | Noted: 2022-02-09

## 2024-06-25 PROBLEM — M47.812 FACET ARTHROPATHY, CERVICAL: Status: ACTIVE | Noted: 2024-06-25

## 2024-06-25 PROBLEM — M67.863: Status: ACTIVE | Noted: 2024-06-25

## 2024-06-25 PROBLEM — M79.2 INTRACTABLE NEUROPATHIC PAIN OF LEFT LOWER EXTREMITY: Status: ACTIVE | Noted: 2024-06-25

## 2024-06-25 PROBLEM — M54.17 LUMBOSACRAL NEURITIS: Status: ACTIVE | Noted: 2024-06-25

## 2024-06-25 PROBLEM — M67.864: Status: ACTIVE | Noted: 2024-06-25

## 2024-06-25 PROBLEM — M47.816 SPONDYLOSIS OF LUMBAR REGION WITHOUT MYELOPATHY OR RADICULOPATHY: Status: ACTIVE | Noted: 2024-06-25

## 2024-06-25 PROBLEM — E56.9: Status: ACTIVE | Noted: 2024-06-25

## 2024-06-25 PROBLEM — K59.00 CONSTIPATION: Status: ACTIVE | Noted: 2024-06-25

## 2024-06-25 PROBLEM — Z96.653 PRESENCE OF ARTIFICIAL KNEE JOINT, BILATERAL: Status: ACTIVE | Noted: 2024-06-25

## 2024-06-25 PROBLEM — M21.372 FOOT DROP, LEFT: Status: ACTIVE | Noted: 2024-06-25

## 2024-06-25 PROBLEM — M51.369 LUMBAR DEGENERATIVE DISC DISEASE: Status: ACTIVE | Noted: 2024-06-25

## 2024-06-25 PROBLEM — G63: Status: ACTIVE | Noted: 2024-06-25

## 2024-06-25 PROBLEM — Z96.651 HISTORY OF RIGHT KNEE JOINT REPLACEMENT: Status: ACTIVE | Noted: 2024-06-25

## 2024-06-25 PROBLEM — M53.2X6 LUMBAR SPINE INSTABILITY: Status: ACTIVE | Noted: 2024-06-25

## 2024-06-25 PROBLEM — G89.28 CHRONIC POST-OPERATIVE PAIN: Status: ACTIVE | Noted: 2024-06-25

## 2024-06-26 ENCOUNTER — APPOINTMENT (OUTPATIENT)
Dept: PRIMARY CARE | Facility: CLINIC | Age: 54
End: 2024-06-26
Payer: COMMERCIAL

## 2024-06-26 ENCOUNTER — APPOINTMENT (OUTPATIENT)
Dept: GASTROENTEROLOGY | Facility: CLINIC | Age: 54
End: 2024-06-26
Payer: COMMERCIAL

## 2024-06-26 ENCOUNTER — TELEPHONE (OUTPATIENT)
Dept: PRIMARY CARE | Facility: CLINIC | Age: 54
End: 2024-06-26

## 2024-06-26 VITALS
DIASTOLIC BLOOD PRESSURE: 87 MMHG | BODY MASS INDEX: 28.42 KG/M2 | SYSTOLIC BLOOD PRESSURE: 131 MMHG | HEIGHT: 71 IN | WEIGHT: 203 LBS | OXYGEN SATURATION: 97 % | HEART RATE: 60 BPM

## 2024-06-26 DIAGNOSIS — K59.00 CONSTIPATION, UNSPECIFIED CONSTIPATION TYPE: ICD-10-CM

## 2024-06-26 PROCEDURE — 3075F SYST BP GE 130 - 139MM HG: CPT | Performed by: NURSE PRACTITIONER

## 2024-06-26 PROCEDURE — 3079F DIAST BP 80-89 MM HG: CPT | Performed by: NURSE PRACTITIONER

## 2024-06-26 PROCEDURE — 99203 OFFICE O/P NEW LOW 30 MIN: CPT | Performed by: NURSE PRACTITIONER

## 2024-06-26 RX ORDER — AMOXICILLIN 250 MG
1 CAPSULE ORAL DAILY
Qty: 30 TABLET | Refills: 1 | Status: SHIPPED | OUTPATIENT
Start: 2024-06-26 | End: 2024-08-25

## 2024-06-26 RX ORDER — POLYETHYLENE GLYCOL 3350 17 G/17G
17 POWDER, FOR SOLUTION ORAL DAILY
Qty: 510 G | Refills: 0 | Status: SHIPPED | OUTPATIENT
Start: 2024-06-26

## 2024-06-26 ASSESSMENT — ENCOUNTER SYMPTOMS
CONFUSION: 0
CHILLS: 0
COUGH: 0
PALPITATIONS: 0
SHORTNESS OF BREATH: 0
TROUBLE SWALLOWING: 0
FEVER: 0
SORE THROAT: 0
ROS GI COMMENTS: SEE HPI
DIZZINESS: 0
WOUND: 0
JOINT SWELLING: 0
BRUISES/BLEEDS EASILY: 0
ADENOPATHY: 0
DIFFICULTY URINATING: 0
WEAKNESS: 0
ARTHRALGIAS: 0

## 2024-06-26 NOTE — PROGRESS NOTES
Subjective   Patient ID: Essie Olvera is a 54 y.o. female with PMH of HTN, HLD, palpitations, hiatal hernia, depression, and gastric sleeve surgery (2021) who was referred by aBltazar Yeh DO for New Patient Visit (Constipation/Colonoscopy done 6/9/2020 with Dr. Whitt/).     Patient's PCP is Baltazar Yeh DO     HPI  Patient has had constipation since February 2024. She is having about 1 BM per week currently. She has been trying colace prescribed by her PCP since 4/12/2024 but had no improvement with this. She denies any unintended weight loss, nausea, vomiting, dysphagia, hematemesis, melena, diarrhea, or rectal bleeding.     Colonoscopy in 2020 was normal       Summary of endoscopies:  - Colonoscopy 6/2020: diverticulosis, nonbleeding internal hemorrhoids     Social Hx:  Tobacco: former   Etoh: none   Recreational drug use: none  NSAIDs: none      Family Hx:  No GI malignancy, IBD, or pancreatitis     Review of Systems:  Review of Systems   Constitutional:  Negative for chills and fever.   HENT:  Negative for sore throat and trouble swallowing.    Respiratory:  Negative for cough and shortness of breath.    Cardiovascular:  Negative for chest pain and palpitations.   Gastrointestinal:         SEE HPI   Endocrine: Negative for cold intolerance and heat intolerance.   Genitourinary:  Negative for difficulty urinating.   Musculoskeletal:  Negative for arthralgias and joint swelling.   Skin:  Negative for rash and wound.   Neurological:  Negative for dizziness and weakness.   Hematological:  Negative for adenopathy. Does not bruise/bleed easily.   Psychiatric/Behavioral:  Negative for confusion.         Medications:  Prior to Admission medications    Medication Sig Start Date End Date Taking? Authorizing Provider   calcium citrate-vitamin D3 200 mg-6.25 mcg (250 unit) tablet Take 500 mg by mouth. 5/28/21   Historical Provider, MD   cyanocobalamin, vitamin B-12, 1,000 mcg/15 mL liquid [facility] B-12 1000 MCG/15ML  Oral Liquid   Refills: 0        Start : 6-Apr-2022   Active  240 ML Bottle 4/6/22   Historical Provider, MD   loratadine (Claritin) 10 mg tablet Take 1 tablet (10 mg) by mouth once daily. 4/12/24 4/12/25  Baltazar Yeh DO   mv-min-folic acid-lutein (Essential Woman 50 Plus) 0.4-250 mg-mcg tablet Take 1 tablet by mouth 1 (one) time each day. 4/6/22   Historical Provider, MD   polyethylene glycol (Miralax) 17 gram/dose powder Take 17 g by mouth once daily. Mix 1 scoop into 8oz water and drink 1-2 times daily 6/26/24   MARCIA Danielson-CNP   sennosides-docusate sodium (Lyudmila-Colace) 8.6-50 mg tablet Take 1 tablet by mouth once daily. 6/26/24 8/25/24  KARO Danielson   traZODone (Desyrel) 50 mg tablet Take 1 tablet (50 mg) by mouth as needed at bedtime for sleep.  Patient not taking: Reported on 6/26/2024 1/3/24 1/2/25  Baltazar Yeh DO   zinc gluconate 50 mg tablet Take 1 tablet (50 mg) by mouth once daily. 4/6/22   Historical Provider, MD   cholecalciferol (Vitamin D-3) 50 mcg (2,000 unit) capsule Take 2 capsules (100 mcg) by mouth once daily. 3/11/20 6/26/24  Historical Provider, MD       Allergies:  Pollen extracts, Sulfa (sulfonamide antibiotics), and Sulfamethoxazole    Past Medical History:  She has a past medical history of Acute bacterial sinusitis (02/28/2024), Acute bronchitis (02/28/2024), Back pain (02/25/2021), Bacterial vaginosis (02/28/2024), Bilateral leg weakness (10/23/2018), Blurry vision, bilateral (07/26/2017), Candidal intertrigo (02/28/2024), Chalazion of right upper eyelid (07/26/2017), Chronic low back pain (03/30/2021), Chronic pain of both knees (06/28/2019), Decreased range of motion of right knee (05/25/2022), History of chlamydia (05/20/2017), History of trichomonal vaginitis (08/26/2003), Osteoarthritis of knee, unspecified (07/25/2022), Other malaise (03/30/2021), Urinary tract infectious disease (02/28/2024), Vaginal discharge (02/28/2024), Viral upper  respiratory tract infection (02/28/2024), and Weakness of both lower extremities (10/23/2018).    Past Surgical History:  She has a past surgical history that includes Total vaginal hysterectomy (09/19/2017); Knee Arthroplasty (09/19/2017); Other surgical history (06/16/2021); and Other surgical history (04/06/2022).    Social History:  She reports that she quit smoking about 15 months ago. Her smoking use included cigars. She has been exposed to tobacco smoke. She has never used smokeless tobacco. She reports that she does not drink alcohol and does not use drugs.    Objective   Physical exam:  Physical Exam  Constitutional:       General: She is not in acute distress.     Appearance: Normal appearance.   HENT:      Mouth/Throat:      Mouth: Mucous membranes are moist.      Comments: pink  Eyes:      Conjunctiva/sclera: Conjunctivae normal.      Pupils: Pupils are equal, round, and reactive to light.   Cardiovascular:      Rate and Rhythm: Normal rate and regular rhythm.      Heart sounds: No murmur heard.  Pulmonary:      Effort: Pulmonary effort is normal.      Breath sounds: Normal breath sounds.   Abdominal:      General: Bowel sounds are normal. There is no distension.      Palpations: Abdomen is soft.      Tenderness: There is abdominal tenderness (diffuse abdominal tenderness). There is no guarding.   Skin:     General: Skin is warm and dry.      Coloration: Skin is not jaundiced.   Neurological:      Mental Status: She is alert and oriented to person, place, and time.   Psychiatric:         Mood and Affect: Mood normal.         Behavior: Behavior normal.          Assessment/Plan     Constipation   Having constipation for 4 months. Colonoscopy in 2020 was normal. Discussed option for colonoscopy due to change in bowel habits and risk of colon cancer though less likely with relatively recent normal colonoscopy. Patient defers for now. Will try to manage conservatively with medication. Have sent miralax and  senna to take daily. If no improvement, could consider linzess. Pt may consider colonoscopy at a later time if no improvement in symptoms.     Previous meds: colace 4/12/2024            MARCIA Danielson-CNP

## 2024-06-26 NOTE — PATIENT INSTRUCTIONS
Thank you for coming to your appointment today   - Take miralax once daily. Mix 1 scoop into 8oz water and drink once daily. You can decrease to a half dose per day if needed, or take it every other day   - Take senna 1-2 tablets at night.   - We discussed repeat colonoscopy; we can defer for now  - Follow up 1 month     Please call [phone] with any questions or concerns       If you utilize [employer] messages, please understand that these are intended to be used for simple and straightforward medical questions. If you have a more complex question or numerous complaints, an office appointment may be needed.

## 2024-06-26 NOTE — TELEPHONE ENCOUNTER
Spoke with patient yesterday. Patient needs to be seen by her PCP to have FMLA paperwork filled out. Dr. Yeh has no 30 minute appointments until September. Please advise

## 2024-06-27 NOTE — TELEPHONE ENCOUNTER
Asked patient to drop off form so provider could review. She is aware pcp is only here Monday then off the rest of the week.

## 2024-07-01 NOTE — TELEPHONE ENCOUNTER
Spoke with patient. She is seeing a therapist through work. I let her know they would be the ones to fill out the forms. She verbalized understanding.

## 2024-07-01 NOTE — TELEPHONE ENCOUNTER
I reviewed forms-unfortunately at our last visit 5/1/2024 we specifically talked and documented how she was no longer taking paroxetine and not having any issues with depression or anxiety so it would be difficult to fill this out based on last encounter.  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  If she feels she would like to go to counseling I think that that is reasonable but there may be specific things available to her through work.  She asking for FMLA just for days off to go to therapy?  Has she established with anybody?

## 2024-07-31 ENCOUNTER — APPOINTMENT (OUTPATIENT)
Dept: GASTROENTEROLOGY | Facility: CLINIC | Age: 54
End: 2024-07-31
Payer: COMMERCIAL

## 2025-02-05 ENCOUNTER — APPOINTMENT (OUTPATIENT)
Dept: PRIMARY CARE | Facility: CLINIC | Age: 55
End: 2025-02-05
Payer: COMMERCIAL